# Patient Record
Sex: FEMALE | Race: WHITE | NOT HISPANIC OR LATINO | Employment: OTHER | URBAN - METROPOLITAN AREA
[De-identification: names, ages, dates, MRNs, and addresses within clinical notes are randomized per-mention and may not be internally consistent; named-entity substitution may affect disease eponyms.]

---

## 2017-05-10 ENCOUNTER — GENERIC CONVERSION - ENCOUNTER (OUTPATIENT)
Dept: OTHER | Facility: OTHER | Age: 57
End: 2017-05-10

## 2017-08-08 ENCOUNTER — ALLSCRIPTS OFFICE VISIT (OUTPATIENT)
Dept: OTHER | Facility: OTHER | Age: 57
End: 2017-08-08

## 2017-08-08 DIAGNOSIS — G40.909 EPILEPSY WITHOUT STATUS EPILEPTICUS, NOT INTRACTABLE (HCC): ICD-10-CM

## 2017-08-08 DIAGNOSIS — D72.819 DECREASED WHITE BLOOD CELL COUNT: ICD-10-CM

## 2017-08-25 ENCOUNTER — ALLSCRIPTS OFFICE VISIT (OUTPATIENT)
Dept: OTHER | Facility: OTHER | Age: 57
End: 2017-08-25

## 2018-01-12 NOTE — PROGRESS NOTES
Recorded as Task  Date: 05/07/2016 02:25 PM, Created By: Angie Wilburn  Task Name: Follow Up  Assigned To: Adria  Regarding Patient: Jose J Zavala, Status: Complete  Comment:   Angie Wilburn -  07 May 2016 2:25 PM    TASK CREATED  Received a critical lab page on 5/7/2016  about ANC 1 3  WBC 3 4  Hgb 15 3  HCT 45 2  plt 389    leukopenia / neutropenia is a chronic issue likely related to Depakote  She has a follow-up  appointment with Dr Dana Benítez on 5/17 for evaluation  to consider transitioning to lamotrigine  history of breakthrough seizure with weaning of Depakote  No action taken over the weekend given the chronic nature of the neutropenia  Burton Shelton  - 09 May 2016 1:45 PM    TASK REPLIED TO: Previously Assigned To Eddy Langston improved from prior  Will discuss possible transition off valproate to lamotrigine at 5/17 visit  Burton Shelton - 09 May 2016 1:45 PM    TASK COMPLETED      Electronically signed by:Burton DIAMOND    May  9 2016  1:46PM EST Author

## 2018-01-13 VITALS
WEIGHT: 121.19 LBS | OXYGEN SATURATION: 92 % | HEART RATE: 53 BPM | BODY MASS INDEX: 28.17 KG/M2 | SYSTOLIC BLOOD PRESSURE: 118 MMHG | DIASTOLIC BLOOD PRESSURE: 64 MMHG

## 2018-01-15 NOTE — PROGRESS NOTES
Recorded as Task  Date: 04/20/2016 07:06 PM, Created By: Lamin Dhillon  Task Name: Follow Up  Assigned To: Neurology Clinical,Team  Regarding Patient: Angela Alcaraz, Status: Complete  Comment:   Burton Shelton  - 20 Apr 2016 7:06 PM    TASK CREATED  Haven't seen updated CBC w/ diff  Called caregiver, Saira Hudson  She thinks blood work may have been done in March at [facility]  Please contact lab for all results since 2/26  Thanks  If ANC < 1, then will do  the following (will review at next visit):   1  start lamotrigine 25 mg QOD x 2 wks, 25 daily x 1 wk, 25 bid x 1 wk, 50 bid x 1 wk, 75 mg bid x 1 wk and then 100 mg bid  2  Check lamotrigine, depakote and phenobarb levels after one week on target  dose of lamotrigine  3  If lamotrigine level adequate then decrease depakote to 250 mg bid x 1 wk  4  Then decrease depakote to 250 mg daily while simultaneously increasing lamotrigine to 125 mg bid x 1 wk  5  Then stop depakote and increase  lamotrigine to 150 mg bid  Callie Haney - 21 Apr 2016 8:21 AM    TASK REPLIED TO: Previously Assigned To Neurology Clinical,Team  I contacted [facility] and s/w Jaspreet Thurston, the last lab she has on file for her were from 2/15/16  I checked pt chart and  it looks like [facility] is the only lab that she goes to  Burton Shelton - 21 Apr 2016 10:57 AM    TASK REPLIED TO: Previously Assigned To Burton Shelton  CBC w/ diff ordered  Will need to be sent to [facility]  She should get this done asap  They should call  us the next day for the results  Corina Lewis - 21 Apr 2016 11:24 AM    TASK EDITED  caregiver notified of same and requested lab slip be mailed home  they will have it drawn as soon as they can   Corina Lewis - 21 Apr 2016 11:24 AM     TASK COMPLETED      Electronically signed by:Burton DIAMOND    May  9 2016  1:44PM EST Author

## 2018-01-16 NOTE — PROGRESS NOTES
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)    Plan  Leukopenia    · (1) CBC/PLT/DIFF; Status:Active; Requested for:85Skn6280;    Perform:[facility]; Due:82Qck3176; Ordered; For:Leukopenia; Ordered By:Андрей Henson); · Follow-up visit in 6 months Evaluation and Treatment  Follow-up  Status: Complete   Done: [id number]   Ordered; For: Leukopenia; Ordered By: Chencho Márquez Performed:  Due: 87ERW5704; Last Updated By: Adam Griffith; 8/25/2017 2:54:24 PM    Discussion/Summary  Discussion Summary:   #1  Leukopenia in a patient with Down syndrome she is also on anti-seizure medication such as valproic acid, Lamictal , Leukopenia is common in patient was Down syndrome about 73% of the cases, this has been going on for a few years, I would continue watchful observation, multivitamin one tablet by mouth daily  #2  Elevated hemoglobin with polycythemia again this is common in patient with Down syndrome and she might have sleep apnea as well continue watchful observation and follow-up in 6 months with CBC and differential       History of Present Illness  HPI: 69-year-old  female with history of Down syndrome, came today with her companionship the patient herself is unable to provide any clinical history on the information was obtained from conversion with the companionship as well as reviewing the medical records the patient is here for history of neutropenia she had a history of seizure disorder had been on phenobarbital, Depakote so she was 11year-old    On August 2015 WBC 2 7, hemoglobin 14 9, MCV 97, RDW 13 6, platelets 386,509, 99% neutrophils, 59% lymphocytes  In July 2015 WBC 2 3, hemoglobin 13 5, MCV 97, she has normal liver enzymes,  In January 2012 WBC 2 4, hemoglobin 14 6, platelets 361,154  On November 2011 WBC 1 9, hemoglobin 14 4, platelets 591,271  The patient according to the companionship/caregiver did not have any frequent upper respiratory tract infection or pneumonia, except for pneumonia in 2005   Interval History: she is here with her sister, Natalie Shelton and her aide  She was last seen she has been medically stable  She did have one UTI  No other infections  No seizures  She's doing well medically  Review of Systems  Complete-Female:   Constitutional: No fever, no chills, feels well, no tiredness, no recent weight gain or weight loss  Eyes: No complaints of eye pain, no red eyes, no eyesight problems, no discharge, no dry eyes, no itching of eyes  ENT: no complaints of earache, no loss of hearing, no nose bleeds, no nasal discharge, no sore throat, no hoarseness  Cardiovascular: No complaints of slow heart rate, no fast heart rate, no chest pain, no palpitations, no leg claudication, no lower extremity edema  Respiratory: No complaints of shortness of breath, no wheezing, no cough, no SOB on exertion, no orthopnea, no PND  Gastrointestinal: No complaints of abdominal pain, no constipation, no nausea or vomiting, no diarrhea, no bloody stools  Genitourinary: No complaints of dysuria, no incontinence, no pelvic pain, no dysmenorrhea, no vaginal discharge or bleeding  Musculoskeletal: No complaints of arthralgias, no myalgias, no joint swelling or stiffness, no limb pain or swelling  Integumentary: No complaints of skin rash or lesions, no itching, no skin wounds, no breast pain or lump  Neurological: No complaints of headache, no confusion, no convulsions, no numbness, no dizziness or fainting, no tingling, no limb weakness, no difficulty walking  Psychiatric: Not suicidal, no sleep disturbance, no anxiety or depression, no change in personality, no emotional problems  Endocrine: No complaints of proptosis, no hot flashes, no muscle weakness, no deepening of the voice, no feelings of weakness     Hematologic/Lymphatic: No complaints of swollen glands, no swollen glands in the neck, does not bleed easily, does not bruise easily  Active Problems    1  Convulsive disorder (780 39) (R56 9)   2  Dementia (294 20) (F03 90)   3  Difficulty swallowing (787 20) (R13 10)   4  Epilepsy (345 90) (G40 909)   5  High risk medication use (V58 69) (Z79 899)   6  Hypothyroidism (244 9) (E03 9)   7  Leukopenia (288 50) (D72 819)   8  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)    Social History    · Denied: History of Caffeine Use   · Denied: History of Current Smoker   · Denied: History of Drug Use   · Marital History - Single   · Never A Smoker    Current Meds   1  Alrex 0 2 % Ophthalmic Suspension; Instill one drop into left eye two times weekly; Therapy: (Recorded:05Mar2014) to Recorded   2  Ciloxan 0 3 % Ophthalmic Solution; instill one drop into left eye twice weekly; Therapy: (Recorded:05Mar2014) to Recorded   3  Citracal/Vitamin D 250-200 MG-UNIT TABS; Therapy: (Recorded:05Mar2014) to Recorded   4  Claritin 10 MG Oral Tablet; Therapy: (Recorded:05Mar2014) to Recorded   5  Divalproex Sodium  MG Oral Tablet Extended Release 24 Hour; Take 1 tablet   twice daily; Therapy: 08Aug2017 to (Evaluate:67Dlp2359)  Requested for: 08Aug2017; Last   Rx:08Aug2017 Ordered   6  Gabapentin 400 MG Oral Capsule; Take 1 capsule twice daily; Therapy: 46FGQ0289 to Recorded   7  Latuda 80 MG Oral Tablet; TAKE 1 TABLET Bedtime; Therapy: 57GBL4795 to Recorded   8  Loratadine 10 MG Oral Capsule; Therapy: [phone]) to Recorded   9  Neosporin OINT; Therapy: [phone]) to Recorded   10  Pataday 0 2 % Ophthalmic Solution; INSTILL 1 DROP  INTO AFFECTED EYE(S) ONCE    DAILY AS DIRECTED; Therapy: (Recorded:05Mar2014) to Recorded   11  PHENobarbital 32 4 MG Oral Tablet; Take 1 tablet twice daily; Therapy: 22Apr2015 to (Evaluate:41Chl0710); Last Rx:08Aug2017 Ordered   12  Synthroid 25 MCG Oral Tablet; Therapy: (Recorded:05Mar2014) to Recorded   13  Thera TABS; Therapy: [phone]) to Recorded   14   Tylenol TABS; 500 mg  two tablets once daily; Therapy: (Recorded:05Mar2014) to Recorded   15  Vitamin D3 2000 UNIT Oral Capsule; take 1 capsule daily; Therapy: 30UPQ8853 to (Evaluate:18Nov2017)  Requested for: 23Nov2016; Last    Rx:23Nov2016 Ordered    Allergies    1  No Known Drug Allergies    Vitals  Vital Signs    Recorded: 49Vhy3324 02:41PM   Temperature 96 2 F   Heart Rate 63   Respiration 16   Systolic 888   Diastolic 72   Height 4 ft 7 in   Weight 119 lb 4 0 oz   BMI Calculated 27 72   BSA Calculated 1 41   O2 Saturation 97     Physical Exam    Constitutional   General appearance: No acute distress, well appearing and well nourished  Eyes   Conjunctiva and lids: No swelling, erythema or discharge  Pupils and irises: Equal, round and reactive to light  Ears, Nose, Mouth, and Throat   External inspection of ears and nose: Normal     Oropharynx: Normal with no erythema, edema, exudate or lesions  Pulmonary   Auscultation of lungs: Clear to auscultation  Cardiovascular   Auscultation of heart: Normal rate and rhythm, normal S1 and S2, without murmurs  Examination of extremities for edema and/or varicosities: Normal     Carotid pulses: Normal     Abdomen   Abdomen: Non-tender, no masses  Liver and spleen: No hepatomegaly or splenomegaly  Lymphatic   Palpation of lymph nodes in neck: No lymphadenopathy  Musculoskeletal   Gait and station: Normal     Digits and nails: Normal without clubbing or cyanosis  Inspection/palpation of joints, bones, and muscles: Normal     Skin   Skin and subcutaneous tissue: Normal without rashes or lesions  Neurologic   Cranial nerves: Cranial nerves 2-12 intact  Sensation: No sensory loss      Psychiatric   Orientation to person, place, and time: Normal     Mood and affect: Normal         ECOG 1       Results/Data  Results Form:   Results   Lab Results WBC 2 9, hemoglobin 15 4, hematocrit 46 1, valproic acid 37, MCV 97, platelets 204,896, 21% neutrophils, 57% lymphocytes, 12% monocytes  Future Appointments    Date/Time Provider Specialty Site   02/27/2018 11:30 AM Laurie Hemphill, [facility]   02/19/2018 02:00 PM KEVIN Mcdonnell  Neurology [place]     Signatures   Electronically signed by :  KEVIN Colmenares ; Aug 25 2017  3:11PM EST                       (Author)

## 2018-01-16 NOTE — MISCELLANEOUS
Message  pt no show for appt, today-unable to leave message-mailbox full-only one phone number      Active Problems    1  Convulsive disorder (780 39) (R56 9)   2  Dementia (294 20) (F03 90)   3  Difficulty swallowing (787 20) (R13 10)   4  Down syndrome (758 0) (Q90 9)   5  Epilepsy (345 90) (G40 909)   6  High risk medication use (V58 69) (Z79 899)   7  Hypothyroidism (244 9) (E03 9)   8  Leukopenia (288 50) (D72 819)    Current Meds   1  Alrex 0 2 % Ophthalmic Suspension; Instill one drop into left eye two times weekly; Therapy: (Recorded:05Mar2014) to Recorded   2  Ciloxan 0 3 % Ophthalmic Solution (Ciprofloxacin HCl); instill one drop into left eye twice   weekly; Therapy: (Recorded:05Mar2014) to Recorded   3  Citracal/Vitamin D 250-200 MG-UNIT TABS; Therapy: (Recorded:05Mar2014) to Recorded   4  Claritin 10 MG Oral Tablet; Therapy: (Recorded:05Mar2014) to Recorded   5  Divalproex Sodium 500 MG Oral Tablet Delayed Release (Depakote); Take 1 tablet twice   daily; Therapy: 11GKK7675 to (Evaluate:18Nov2017)  Requested for: 23Nov2016; Last   Rx:23Nov2016 Ordered   6  Gabapentin 400 MG Oral Capsule; Take 1 capsule twice daily; Therapy: 05BYP1531 to Recorded   7  Latuda 80 MG Oral Tablet; TAKE 1 TABLET Bedtime; Therapy: 78OVW4110 to Recorded   8  Loratadine 10 MG Oral Capsule; Therapy: () to Recorded   9  Neosporin OINT; Therapy: () to Recorded   10  Pataday 0 2 % Ophthalmic Solution; INSTILL 1 DROP  INTO AFFECTED EYE(S) ONCE    DAILY AS DIRECTED; Therapy: (Recorded:05Mar2014) to Recorded   11  PHENobarbital 32 4 MG Oral Tablet; Take 1 tablet twice daily; Therapy: 22Apr2015 to (Evaluate:71Abs8406); Last Rx:29Mar2017 Ordered   12  Synthroid 25 MCG Oral Tablet (Levothyroxine Sodium); Therapy: (Recorded:05Mar2014) to Recorded   13  Thera TABS; Therapy: () to Recorded   14  TraZODone HCl - 100 MG Oral Tablet;     Therapy: 54KTV2340 to Recorded   15  Tylenol TABS; 500 mg  two tablets once daily; Therapy: (Recorded:05Mar2014) to Recorded   16  Vitamin D3 2000 UNIT Oral Capsule; take 1 capsule daily; Therapy: 76EIH2277 to (Evaluate:18Nov2017)  Requested for: 23Nov2016; Last    Rx:23Nov2016 Ordered    Allergies    1   No Known Drug Allergies    Signatures   Electronically signed by : Young Lees, ; May 10 2017 11:11AM EST                       (Author)

## 2018-01-16 NOTE — MISCELLANEOUS
Provider Comments  Provider Comments:   Pt no showed for scheduled appointment   She already has another pending appointment      Signatures   Electronically signed by : KEVIN Taylor ; Apr 20 2016  7:07PM EST                       (Author)

## 2018-01-22 VITALS
OXYGEN SATURATION: 97 % | HEIGHT: 55 IN | SYSTOLIC BLOOD PRESSURE: 120 MMHG | RESPIRATION RATE: 16 BRPM | BODY MASS INDEX: 27.6 KG/M2 | HEART RATE: 63 BPM | TEMPERATURE: 96.2 F | DIASTOLIC BLOOD PRESSURE: 72 MMHG | WEIGHT: 119.25 LBS

## 2018-01-31 ENCOUNTER — TELEPHONE (OUTPATIENT)
Dept: NEUROLOGY | Facility: CLINIC | Age: 58
End: 2018-01-31

## 2018-01-31 ENCOUNTER — TELEPHONE (OUTPATIENT)
Dept: HEMATOLOGY ONCOLOGY | Facility: CLINIC | Age: 58
End: 2018-01-31

## 2019-04-23 ENCOUNTER — TELEPHONE (OUTPATIENT)
Dept: HEMATOLOGY ONCOLOGY | Facility: CLINIC | Age: 59
End: 2019-04-23

## 2019-05-15 ENCOUNTER — OFFICE VISIT (OUTPATIENT)
Dept: HEMATOLOGY ONCOLOGY | Facility: CLINIC | Age: 59
End: 2019-05-15
Payer: MEDICARE

## 2019-05-15 VITALS
DIASTOLIC BLOOD PRESSURE: 70 MMHG | TEMPERATURE: 98.2 F | WEIGHT: 137 LBS | RESPIRATION RATE: 16 BRPM | BODY MASS INDEX: 30.82 KG/M2 | HEIGHT: 56 IN | SYSTOLIC BLOOD PRESSURE: 122 MMHG | OXYGEN SATURATION: 93 % | HEART RATE: 58 BPM

## 2019-05-15 DIAGNOSIS — D70.8 OTHER NEUTROPENIA (HCC): Primary | ICD-10-CM

## 2019-05-15 PROCEDURE — 99213 OFFICE O/P EST LOW 20 MIN: CPT | Performed by: PHYSICIAN ASSISTANT

## 2020-01-15 ENCOUNTER — APPOINTMENT (EMERGENCY)
Dept: CT IMAGING | Facility: HOSPITAL | Age: 60
End: 2020-01-15
Payer: MEDICARE

## 2020-01-15 ENCOUNTER — HOSPITAL ENCOUNTER (EMERGENCY)
Facility: HOSPITAL | Age: 60
Discharge: NURSING, REHAB OR LONG-TERM CARE | End: 2020-01-15
Attending: EMERGENCY MEDICINE
Payer: MEDICARE

## 2020-01-15 VITALS
SYSTOLIC BLOOD PRESSURE: 141 MMHG | HEART RATE: 57 BPM | WEIGHT: 130 LBS | BODY MASS INDEX: 29.15 KG/M2 | RESPIRATION RATE: 17 BRPM | TEMPERATURE: 97.8 F | OXYGEN SATURATION: 93 % | DIASTOLIC BLOOD PRESSURE: 65 MMHG
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
## 2020-01-15 DIAGNOSIS — S09.93XA DENTAL INJURY: ICD-10-CM

## 2020-01-15 DIAGNOSIS — S01.511A LACERATION OF LIP: ICD-10-CM

## 2020-01-15 DIAGNOSIS — S09.90XA HEAD INJURY: ICD-10-CM

## 2020-01-15 PROCEDURE — 90715 TDAP VACCINE 7 YRS/> IM: CPT | Performed by: PHYSICIAN ASSISTANT

## 2020-01-15 PROCEDURE — 70450 CT HEAD/BRAIN W/O DYE: CPT

## 2020-01-15 PROCEDURE — 72125 CT NECK SPINE W/O DYE: CPT

## 2020-01-15 PROCEDURE — 99284 EMERGENCY DEPT VISIT MOD MDM: CPT

## 2020-01-15 PROCEDURE — 99284 EMERGENCY DEPT VISIT MOD MDM: CPT | Performed by: PHYSICIAN ASSISTANT

## 2020-01-15 PROCEDURE — 12011 RPR F/E/E/N/L/M 2.5 CM/<: CPT | Performed by: PHYSICIAN ASSISTANT

## 2020-01-15 PROCEDURE — 90471 IMMUNIZATION ADMIN: CPT

## 2020-01-15 RX ORDER — PHENOBARBITAL 32.4 MG/1
32.4 TABLET ORAL 2 TIMES DAILY
COMMUNITY
Start: 2009-01-20

## 2020-01-15 RX ORDER — DIVALPROEX SODIUM 500 MG/1
1 TABLET, EXTENDED RELEASE ORAL 2 TIMES DAILY
COMMUNITY
Start: 2017-08-08

## 2020-01-15 RX ORDER — BUSPIRONE HYDROCHLORIDE 5 MG/1
5 TABLET ORAL 2 TIMES DAILY
COMMUNITY

## 2020-01-15 RX ORDER — LORATADINE 10 MG/1
TABLET ORAL
COMMUNITY

## 2020-01-15 RX ORDER — LEVOTHYROXINE SODIUM 0.03 MG/1
TABLET ORAL
COMMUNITY
Start: 2009-01-20

## 2020-01-15 RX ORDER — ACETAMINOPHEN 500 MG
650 TABLET ORAL
COMMUNITY
Start: 2009-01-20

## 2020-01-15 RX ORDER — LIDOCAINE HYDROCHLORIDE 10 MG/ML
5 INJECTION, SOLUTION EPIDURAL; INFILTRATION; INTRACAUDAL; PERINEURAL ONCE
Status: COMPLETED | OUTPATIENT
Start: 2020-01-15 | End: 2020-01-15

## 2020-01-15 RX ORDER — BISACODYL 10 MG
10 SUPPOSITORY, RECTAL RECTAL
COMMUNITY

## 2020-01-15 RX ORDER — GABAPENTIN 400 MG/1
400 CAPSULE ORAL 2 TIMES DAILY
COMMUNITY
Start: 2015-02-05

## 2020-01-15 RX ORDER — OLOPATADINE HYDROCHLORIDE 2 MG/ML
1 SOLUTION/ DROPS OPHTHALMIC DAILY
COMMUNITY
Start: 2010-08-30

## 2020-01-15 RX ORDER — TRAZODONE HYDROCHLORIDE 100 MG/1
100 TABLET ORAL
COMMUNITY

## 2020-01-15 RX ADMIN — LIDOCAINE HYDROCHLORIDE 5 ML: 10 INJECTION, SOLUTION EPIDURAL; INFILTRATION; INTRACAUDAL; PERINEURAL at 13:45

## 2020-01-15 RX ADMIN — TETANUS TOXOID, REDUCED DIPHTHERIA TOXOID AND ACELLULAR PERTUSSIS VACCINE, ADSORBED 0.5 ML: 5; 2.5; 8; 8; 2.5 SUSPENSION INTRAMUSCULAR at 12:28

## 2020-01-15 NOTE — ED PROVIDER NOTES
H&P Exam - Trauma   Catrina Aragon 60 y o  female MRN: [id number]  Unit/Bed#: ED 12/ED 12 Encounter: [id number]    Assessment/Plan   Trauma Alert: Trauma Acuity: Trauma Evaluation  Model of Arrival: Mode of Arrival: BLS via Trauma Squad Name and Number: upper saucon  Trauma Team: Current Providers  Attending Provider: Lucy Martin DO  Physician Assistant: Aiden Salinas PA-C  Registered Nurse: Astrid Mcgovern RN  Consultants: None    Trauma Active Problems: head injury, dental injury, lip laceration    Trauma Plan: CT head and neck, update tetanus, repair laceration    Chief Complaint:   Chief Complaint   Patient presents with    Trauma     Pt from [facility], tripped and fell, laceration to upper lip, +active bleeding   +missing tooth  History of Present Illness   HPI:  Catrina Aragon is a 60 y o  female who presents with dental injury, lip laceration, head injury that occurred after a fall 1 hour ago  Mechanism:Details of Incident: Pt was walking and tripped, +head strike, noted missing tooth, upper lip laceration bleeding  Injury Date: 01/15/20 Injury Time: 1200 Injury Occurence Location - [address] [place] [address] Way: [facility]    Patient is a 61 y/o F with h/o down syndrome, seizures BIB[facility] after a [facility]  Fall was witnessed, no seizure activity, no LOC  Patient fell forward and hit mouth on the ground  She has a laceration to upper lip and broke an upper tooth  Last tetanus was in 2005  No other injuries          History provided by:  EMS personnel  Trauma  Mechanism of injury: fall  Injury location: face  Injury location detail: lip  Incident location: [facility]  Time since incident: 1 hour  Arrived directly from scene: yes     Fall:       Fall occurred: walking       Impact surface: hard floor       Point of impact: face    Protective equipment:        None       Suspicion of alcohol use: no       Suspicion of drug use: no    EMS/PTA data:       Bystander interventions: none       Ambulatory at scene: yes       Blood loss: minimal       Responsiveness: alert       Loss of consciousness: no       Immobilization: none    Current symptoms:       Associated symptoms:             Denies loss of consciousness  Relevant PMH:       Tetanus status: out of date    Review of Systems   Unable to perform ROS: Patient nonverbal   Constitutional: Negative for fever  HENT: Positive for dental problem  Skin: Positive for wound  Neurological: Negative for loss of consciousness  Historical Information     Immunizations:   Immunization History   Administered Date(s) Administered    Tdap 01/15/2020       Past Medical History:   Diagnosis Date    Seizures ([facility] [address] )      History reviewed  No pertinent family history  History reviewed  No pertinent surgical history      Social History     Socioeconomic History    Marital status: Single     Spouse name: None    Number of children: None    Years of education: None    Highest education level: None   Occupational History    None   Social Needs    Financial resource strain: None    Food insecurity:     Worry: None     Inability: None    Transportation needs:     Medical: None     Non-medical: None   Tobacco Use    Smoking status: None   Substance and Sexual Activity    Alcohol use: None    Drug use: None    Sexual activity: None   Lifestyle    Physical activity:     Days per week: None     Minutes per session: None    Stress: None   Relationships    Social connections:     Talks on phone: None     Gets together: None     Attends Mandaeism service: None     Active member of club or organization: None     Attends meetings of clubs or organizations: None     Relationship status: None    Intimate partner violence:     Fear of current or ex partner: None     Emotionally abused: None     Physically abused: None     Forced sexual activity: None   Other Topics Concern    None   Social History Narrative    None Family History: non-contributory    Meds/Allergies   Prior to Admission Medications   Prescriptions Last Dose Informant Patient Reported? Taking? Calcium Citrate-Vitamin D (CITRACAL/VITAMIN D) 250-200 MG-UNIT TABS   Yes No   Sig: Take by mouth   PHENobarbital 32 4 mg tablet   Yes Yes   Sig: Take 32 4 mg by mouth 2 (two) times a day   acetaminophen (TYLENOL) 500 mg tablet   Yes Yes   Sig: Take 500 mg by mouth   bisacodyl (DULCOLAX) 10 mg suppository   Yes No   Sig: Insert 10 mg into the rectum   busPIRone (BUSPAR) 5 mg tablet   Yes No   Sig: Take 5 mg by mouth 2 (two) times a day   divalproex sodium (DEPAKOTE ER) 500 mg 24 hr tablet   Yes Yes   Sig: Take 1 tablet by mouth 2 (two) times a day   gabapentin (NEURONTIN) 400 mg capsule   Yes Yes   Sig: Take 400 mg by mouth 2 (two) times a day   levothyroxine 25 mcg tablet   Yes Yes   Sig: one tablet daily   loratadine (CLARITIN) 10 mg tablet   Yes No   Sig: Take by mouth   loteprednol (ALREX) 0 2 % SUSP   Yes No   Sig: Apply 1 drop to eye   lurasidone (LATUDA) 80 mg tablet   Yes Yes   Sig: Take 80 mg by mouth   olopatadine HCl (PATADAY) 0 2 % opth drops   Yes Yes   Sig: Apply 1 drop to eye daily   traZODone (DESYREL) 100 mg tablet   Yes No   Sig: Take 100 mg by mouth      Facility-Administered Medications: None       No Known Allergies    PHYSICAL EXAM    PE limited by: nothing    Objective   Vitals:   First set: Temperature: 97 8 °F (36 6 °C) (01/15/20 1214)  Pulse: 58 (01/15/20 1214)  Respirations: 20 (01/15/20 1214)  Blood Pressure: 165/92 (01/15/20 1214)  SpO2: 98 % (01/15/20 1214)    Primary Survey:   (A) Airway: patent  (B) Breathing: normal  (C) Circulation: Pulses:   normal  (D) Disabliity:  GCS Total:  15  (E) Expose:  Completed    Secondary Survey: (Click on Physical Exam tab above)  Physical Exam   Constitutional: She appears well-developed and well-nourished  She is cooperative  She does not appear ill  No distress     Patient tearful   HENT:   Head: Normocephalic  Right Ear: Hearing normal    Left Ear: Hearing normal    Nose: Nose normal    Mouth/Throat: Oropharynx is clear and moist  Abnormal dentition  Bleeding gums, multiple teeth displaced  Eyes: Right conjunctiva is injected  Left conjunctiva is injected  Cardiovascular: Normal rate, regular rhythm and normal heart sounds  No murmur heard  Pulmonary/Chest: Effort normal and breath sounds normal  She has no wheezes  She has no rhonchi  She has no rales  Abdominal: Soft  Normal appearance and bowel sounds are normal  There is no tenderness  Musculoskeletal: Normal range of motion  She exhibits no tenderness or deformity  Neurological: She is alert  She has normal strength  She is not disoriented  Skin: Skin is warm and dry  Laceration noted  No rash noted  Nursing note and vitals reviewed  Invasive Devices     None                 Lab Results:   Results Reviewed     None                 Imaging Studies:   Direct to CT: No  TRAUMA - CT head wo contrast   Final Result by Hang Spicer MD (01/15 1322)      No acute intracranial process or significant interval change  No skull fracture  Chronic microangiopathy  Moderate cerebellar and mild cerebral atrophy  Workstation performed: [id number]         CT cervical spine without contrast   Final Result by Hang Spicer MD (01/15 1326)      No cervical spine fracture or traumatic malalignment  Moderate multilevel cervical degenerative changes  Grade 1 degenerative anterolisthesis C4 on C5                   Workstation performed: [id number]             Other Studies: none    Code Status: No Order  Advance Directive and Living Will:      Power of :    POLST:      Procedures  Laceration repair  Date/Time: 1/15/2020 1:45 PM  Performed by: Viktoria Carlin PA-C  Authorized by: Viktoria Carlin PA-C   Consent: Verbal consent obtained    Risks and benefits: risks, benefits and alternatives were discussed  Consent given by: patient  Body area: head/neck  Location details: upper lip  Full thickness lip laceration: yes  Vermilion border involved: no  Laceration length: 2 5 cm  Foreign bodies: no foreign bodies  Anesthesia: local infiltration    Anesthesia:  Local Anesthetic: lidocaine 1% without epinephrine  Anesthetic total: 2 mL    Wound Dehiscence:  Superficial Wound Dehiscence: simple closure      Procedure Details:  Preparation: Patient was prepped and draped in the usual sterile fashion  Irrigation solution: sterile water  Irrigation method: tap  Amount of cleaning: standard  Subcutaneous closure: 5-0 Vicryl  Number of sutures: 5  Technique: simple  Approximation: close  Approximation difficulty: simple  Patient tolerance: Patient tolerated the procedure well with no immediate complications               ED Course         MDM  Number of Diagnoses or Management Options  Dental injury: new and does not require workup  Fall: new and requires workup  Head injury: new and requires workup  Laceration of lip: new and requires workup  Diagnosis management comments: Patient with facial injury after a fall, will order CT scan to r/o brain hemorrhage  Patient with dental injuries, will refer to oral surgeon  Will repair laceration and update tetanus  Amount and/or Complexity of Data Reviewed  Tests in the radiology section of CPT®: ordered and reviewed    Patient Progress  Patient progress: stable        Disposition  Priority One Transfer: No  Final diagnoses:   Fall   Laceration of lip   Dental injury   Head injury     Time reflects when diagnosis was documented in both MDM as applicable and the Disposition within this note     Time User Action Codes Description Comment    1/15/2020  2:00 PM Osbaldo Pea Add [J20  XXXA] Fall     1/15/2020  2:01 PM Osbaldo Pea Add [L45 957M] Laceration of lip     1/15/2020  2:01 PM Osbaldo Pea Add [G63 [id number]] Dental injury 1/15/2020  2:01 PM Bonifacio Lo Add [I35 85ZG] Head injury       ED Disposition     ED Disposition Condition Date/Time Comment    Discharge Stable Wed Radhames 15, 2020  2:00 PM Ted Waters discharge to home/self care  Follow-up Information     Follow up With Specialties Details Why [address] [facility] and [address]  Call today For recheck [facility]        Discharge Medication List as of 1/15/2020  2:04 PM      CONTINUE these medications which have NOT CHANGED    Details   acetaminophen (TYLENOL) 500 mg tablet Take 500 mg by mouth, Starting Tue 1/20/2009, Historical Med      divalproex sodium (DEPAKOTE ER) 500 mg 24 hr tablet Take 1 tablet by mouth 2 (two) times a day, Starting Tue 8/8/2017, Historical Med      gabapentin (NEURONTIN) 400 mg capsule Take 400 mg by mouth 2 (two) times a day, Starting Thu 2/5/2015, Historical Med      levothyroxine 25 mcg tablet one tablet daily, Historical Med      lurasidone (LATUDA) 80 mg tablet Take 80 mg by mouth, Starting Mon 11/10/2014, Historical Med      olopatadine HCl (PATADAY) 0 2 % opth drops Apply 1 drop to eye daily, Starting Mon 8/30/2010, Historical Med      PHENobarbital 32 4 mg tablet Take 32 4 mg by mouth 2 (two) times a day, Starting Tue 1/20/2009, Historical Med      bisacodyl (DULCOLAX) 10 mg suppository Insert 10 mg into the rectum, Historical Med      busPIRone (BUSPAR) 5 mg tablet Take 5 mg by mouth 2 (two) times a day, Historical Med      Calcium Citrate-Vitamin D (CITRACAL/VITAMIN D) 250-200 MG-UNIT TABS Take by mouth, Historical Med      loratadine (CLARITIN) 10 mg tablet Take by mouth, Historical Med      loteprednol (ALREX) 0 2 % SUSP Apply 1 drop to eye, Historical Med      traZODone (DESYREL) 100 mg tablet Take 100 mg by mouth, Historical Med           No discharge procedures on file        ED Provider  Electronically Signed by         Oli Mariano PA-C  01/15/20 1536

## 2020-01-15 NOTE — DISCHARGE INSTRUCTIONS
Rinse mouth with water after eating  Call oral surgeon today concerning dental trauma  Soft foods until seen by oral surgeon  Tylenol for discomfort  Sutures will dissolve, no need to have them removed  If change in behavior or vomiting return to ER

## 2020-01-20 NOTE — ED NOTES
Addendum 1/20/2020 12:10:  Pam Talley called from patient's residence and states patient bit her stitches open and the wound is "hanging down "  She states the nurse practitioner thinks it might be infected  She also states she never received discharge instructions and when she called back for them she was told there was no follow up  I did fax the discharge instructions that has f/u with oral surgeon on it to her at:  [phone]  I also advised them to return to ER if they are concerned about infection or the wound or they could f/u with plastic surgeon        Candace Staples PA-C  01/20/20 1212

## 2020-01-21 ENCOUNTER — HOSPITAL ENCOUNTER (EMERGENCY)
Facility: HOSPITAL | Age: 60
Discharge: HOME | End: 2020-01-21
Attending: EMERGENCY MEDICINE
Payer: MEDICARE

## 2020-01-21 VITALS
WEIGHT: 136.02 LBS | SYSTOLIC BLOOD PRESSURE: 141 MMHG | DIASTOLIC BLOOD PRESSURE: 81 MMHG | RESPIRATION RATE: 18 BRPM | BODY MASS INDEX: 30.5 KG/M2 | OXYGEN SATURATION: 95 % | TEMPERATURE: 98.6 F | HEART RATE: 86 BPM

## 2020-01-21 DIAGNOSIS — K13.0 INFECTION OF LIP: Primary | ICD-10-CM

## 2020-01-21 PROCEDURE — 99284 EMERGENCY DEPT VISIT MOD MDM: CPT | Performed by: EMERGENCY MEDICINE

## 2020-01-21 PROCEDURE — 99283 EMERGENCY DEPT VISIT LOW MDM: CPT

## 2020-01-21 RX ORDER — CEPHALEXIN 250 MG/1
500 CAPSULE ORAL ONCE
Status: COMPLETED | OUTPATIENT
Start: 2020-01-21 | End: 2020-01-21

## 2020-01-21 RX ORDER — AMOXICILLIN 500 MG/1
500 CAPSULE ORAL EVERY 8 HOURS SCHEDULED
COMMUNITY

## 2020-01-21 RX ORDER — CEPHALEXIN 500 MG/1
500 CAPSULE ORAL 4 TIMES DAILY
Qty: 28 CAPSULE | Refills: 0 | Status: SHIPPED | OUTPATIENT
Start: 2020-01-21 | End: 2020-01-28

## 2020-01-21 RX ORDER — GUAIFENESIN 400 MG/1
400 TABLET ORAL
COMMUNITY

## 2020-01-21 RX ORDER — MINERAL OIL, PETROLATUM 425; 573 MG/G; MG/G
OINTMENT OPHTHALMIC
COMMUNITY

## 2020-01-21 RX ADMIN — CEPHALEXIN 500 MG: 250 CAPSULE ORAL at 14:31

## 2020-01-21 NOTE — ED PROVIDER NOTES
History  Chief Complaint   Patient presents with    Lip Swelling     Duy presents from [facility] as she was seen here fri s/p fall  lip was sutured at this time  Caregiver states that the suture "popped out and the nurses think its infected "      Brought in by staff from [facility]  Seen here 1/15 s/p fall and had complex lip laceration that was repaired in the ED on 1/15  Written instruction were given that instructed staff to contact oral surgery for follow up due to the lip laceration and concerns for loose tooth/dental involvement  We received a call yesterday (1/20 see note) that indicated staff reported they had not received any discharge instructions from 1/15  They reported that staff PAWAN had seen pt and was concerned that the sutures had come loose and there was an infection  They were instructed to have the patient return to ED if they were unable to see oral surgery today  No reports of f/c/s, no change in appetite  Pt w/ MR and unclear when the sutures came out or if pt kept chewing on them until they came loose  History provided by:  Medical records and caregiver  History limited by:  Patient nonverbal   used: No    Medical Problem   Location:  Complex upper lip laceration with delayed healing and secondary cellulitis  Quality:  See above  Severity:  Moderate  Onset quality:  Gradual  Timing:  Constant  Progression:  Worsening  Chronicity:  New  Context:  Related to fall 1/15  Relieved by:  N/a  Worsened by:  N/a  Ineffective treatments:  N/a      Prior to Admission Medications   Prescriptions Last Dose Informant Patient Reported? Taking?    Calcium Citrate-Vitamin D (CITRACAL/VITAMIN D) 250-200 MG-UNIT TABS   Yes No   Sig: Take by mouth   Multiple Vitamin (TAB-A-ANTONIO PO)   Yes Yes   Sig: Take by mouth   Multiple Vitamins-Minerals (VITAMIN D3 COMPLETE PO)   Yes Yes   Sig: Take 1,000 Units by mouth   PHENobarbital 32 4 mg tablet   Yes No   Sig: Take 32 4 mg by mouth 2 (two) times a day   White Petrolatum-Mineral Oil (ARTIFICIAL TEARS)   Yes Yes   acetaminophen (TYLENOL) 500 mg tablet   Yes No   Sig: Take 650 mg by mouth    amoxicillin (AMOXIL) 500 mg capsule   Yes Yes   Sig: Take 500 mg by mouth every 8 (eight) hours   bisacodyl (DULCOLAX) 10 mg suppository   Yes No   Sig: Insert 10 mg into the rectum   busPIRone (BUSPAR) 5 mg tablet   Yes No   Sig: Take 5 mg by mouth 2 (two) times a day   divalproex sodium (DEPAKOTE ER) 500 mg 24 hr tablet   Yes No   Sig: Take 1 tablet by mouth 2 (two) times a day   gabapentin (NEURONTIN) 400 mg capsule   Yes No   Sig: Take 400 mg by mouth 2 (two) times a day   guaiFENesin 400 mg   Yes Yes   Sig: Take 400 mg by mouth every 4 (four) hours   levothyroxine 25 mcg tablet   Yes No   Sig: one tablet daily   loratadine (CLARITIN) 10 mg tablet   Yes No   Sig: Take by mouth   loteprednol (ALREX) 0 2 % SUSP   Yes No   Sig: Apply 1 drop to eye   lurasidone (LATUDA) 80 mg tablet   Yes No   Sig: Take 120 mg by mouth    magnesium hydroxide (MILK OF MAGNESIA) 400 mg/5 mL oral suspension   Yes Yes   Sig: Take 30 mL by mouth daily as needed for constipation   olopatadine HCl (PATADAY) 0 2 % opth drops   Yes No   Sig: Apply 1 drop to eye daily   traZODone (DESYREL) 100 mg tablet   Yes No   Sig: Take 100 mg by mouth      Facility-Administered Medications: None       Past Medical History:   Diagnosis Date    Abnormal blood cell count     ADD (attention deficit disorder)     Conversion disorder     Down syndrome     Dysphagia     Osteoporosis     Psychiatric disorder     Seizures ([facility] [address] )        History reviewed  No pertinent surgical history  History reviewed  No pertinent family history  I have reviewed and agree with the history as documented      Social History     Tobacco Use    Smoking status: Never Smoker    Smokeless tobacco: Never Used   Substance Use Topics    Alcohol use: Not Currently    Drug use: Not Currently        Review of Systems   Unable to perform ROS: Patient nonverbal       Physical Exam  Physical Exam   Constitutional: She appears well-developed and well-nourished  HENT:   Head: Head is with laceration  Nose: Nose normal    Eyes: Conjunctivae are normal    Neck: Neck supple  Cardiovascular: Normal rate  Pulmonary/Chest: Effort normal    Abdominal: Soft  Musculoskeletal: She exhibits no deformity  Neurological: She is alert  Skin: Skin is warm  Psychiatric: She has a normal mood and affect  Nursing note and vitals reviewed  Vital Signs  ED Triage Vitals   Temperature Pulse Respirations Blood Pressure SpO2   01/21/20 1356 01/21/20 1356 01/21/20 1356 01/21/20 1358 01/21/20 1356   98 6 °F (37 °C) 86 18 141/81 95 %      Temp src Heart Rate Source Patient Position - Orthostatic VS BP Location FiO2 (%)   -- 01/21/20 1356 -- -- --    Monitor         Pain Score       01/21/20 1356       No Pain           Vitals:    01/21/20 1356 01/21/20 1358   BP:  141/81   Pulse: 86          Visual Acuity      ED Medications  Medications   cephalexin (KEFLEX) capsule 500 mg (500 mg Oral Given 1/21/20 1431)       Diagnostic Studies  Results Reviewed     None                 No orders to display              Procedures  Procedures         ED Course                               MDM  Number of Diagnoses or Management Options  Infection of lip: new and does not require workup  Diagnosis management comments: Complex lip laceration w/ secondary infection  On exam it appears the wound has been open for a number of days and the sutures didn't come out within the last 24 hours  It is unclear if the wound has been cleaned since her visit here 1/15  Noted to have some tried food around the mouth, so unclear how often oral care is done  Wound was cleaned with sterile saline / gauze and purulent eschar removed  Small pieces of devitalized tissue were also removed    Pt will need f/u w/ plastics for delayed closure once infection clears  Started on abx in ED    Patient Progress  Patient progress: stable        Disposition  Final diagnoses:   Infection of lip     Time reflects when diagnosis was documented in both MDM as applicable and the Disposition within this note     Time User Action Codes Description Comment    1/21/2020  2:39 PM David Angel YULISA Add [K13 0] Infection of lip       ED Disposition     ED Disposition Condition Date/Time Comment    Discharge Stable Tue Jan 21, 2020  2:38 PM Jacky Coe discharge to home/self care  Follow-up Information     Follow up With Specialties Details Why Contact Info    Ventura Burnham MD Plastic Surgery Schedule an appointment as soon as possible for a visit in 1 week  [place] [address]  [address] [address] [phone]            Patient's Medications   Discharge Prescriptions    CEPHALEXIN (KEFLEX) 500 MG CAPSULE    Take 1 capsule (500 mg total) by mouth 4 (four) times a day for 7 days       Start Date: 1/21/2020 End Date: 1/28/2020       Order Dose: 500 mg       Quantity: 28 capsule    Refills: 0     No discharge procedures on file      ED Provider  Electronically Signed by           Missy Mac DO  01/21/20 1663